# Patient Record
Sex: MALE | Race: BLACK OR AFRICAN AMERICAN
[De-identification: names, ages, dates, MRNs, and addresses within clinical notes are randomized per-mention and may not be internally consistent; named-entity substitution may affect disease eponyms.]

---

## 2021-04-18 ENCOUNTER — HOSPITAL ENCOUNTER (EMERGENCY)
Dept: HOSPITAL 35 - ER | Age: 26
Discharge: HOME | End: 2021-04-18
Payer: COMMERCIAL

## 2021-04-18 VITALS — HEIGHT: 70 IN | BODY MASS INDEX: 32.93 KG/M2 | WEIGHT: 230.01 LBS

## 2021-04-18 VITALS — SYSTOLIC BLOOD PRESSURE: 143 MMHG | DIASTOLIC BLOOD PRESSURE: 93 MMHG

## 2021-04-18 DIAGNOSIS — J45.909: ICD-10-CM
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
## 2021-04-18 DIAGNOSIS — Z88.6: ICD-10-CM

## 2021-04-18 DIAGNOSIS — S02.2XXA: Primary | ICD-10-CM
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
## 2021-04-20 NOTE — EKG
[place] [address] "[employer] [employer]
[place]  [address]
Phone:  [phone]                    ELECTROCARDIOGRAM REPORT      
_______________________________________________________________________________
 
Name:       WHIT CASTANONBOBBY BENJAMIN         [id number]     Account #:      [id number]  
Admission:  21    Attend Phys:                          
Discharge:  21    Date of Birth:  [birth year]  
                                                          Report #: [id number]
   [id number]
_______________________________________________________________________________
                         [facility] ED
                                       
Test Date:    2021               Test Time:    02:22:51
Pat Name:     INDY CASTANON             Department:   
Patient ID:   [id number]            Room:          
Gender:                               Technician:   braydon
:          1995               Requested By: Hui Nagel
Order Number: [id number] MD:   Amish Rico
                                 Measurements
Intervals                              Axis          
Rate:         130                      P:            44
VA:           140                      QRS:          60
QRSD:         89                       T:            16
QT:           303                                    
QTc:          446                                    
                           Interpretive Statements
Sinus tachycardia
Otherwise no significant abnormality
Baseline wander in lead(s) V4
No previous ECG available for comparison
Electronically Signed On 2021 9:19:58 CDT by Amish Rico
[email or web address]
 
 
 
 
 
 
 
 
 
 
 
 
 
 
 
 
 
 
 
 
 
  <ELECTRONICALLY SIGNED>
   By: Amish Rico MD, [facility]   
  21     0919
D: 21                           _____________________________________
T: 21                           Amish Rico MD, FACC     /EPI